# Patient Record
Sex: FEMALE | Race: WHITE | NOT HISPANIC OR LATINO
[De-identification: names, ages, dates, MRNs, and addresses within clinical notes are randomized per-mention and may not be internally consistent; named-entity substitution may affect disease eponyms.]

---

## 2018-11-03 ENCOUNTER — TRANSCRIPTION ENCOUNTER (OUTPATIENT)
Age: 53
End: 2018-11-03

## 2020-07-18 ENCOUNTER — EMERGENCY (EMERGENCY)
Facility: HOSPITAL | Age: 55
LOS: 1 days | Discharge: HOME | End: 2020-07-18
Attending: EMERGENCY MEDICINE
Payer: COMMERCIAL

## 2020-07-18 VITALS
DIASTOLIC BLOOD PRESSURE: 77 MMHG | SYSTOLIC BLOOD PRESSURE: 121 MMHG | TEMPERATURE: 98 F | WEIGHT: 139.99 LBS | RESPIRATION RATE: 20 BRPM | HEIGHT: 66 IN | OXYGEN SATURATION: 96 % | HEART RATE: 110 BPM

## 2020-07-18 VITALS
OXYGEN SATURATION: 98 % | RESPIRATION RATE: 20 BRPM | DIASTOLIC BLOOD PRESSURE: 71 MMHG | SYSTOLIC BLOOD PRESSURE: 123 MMHG | HEART RATE: 80 BPM | TEMPERATURE: 98 F

## 2020-07-18 LAB
ALBUMIN SERPL ELPH-MCNC: 4.9 G/DL — SIGNIFICANT CHANGE UP (ref 3.3–5)
ALP SERPL-CCNC: 57 U/L — SIGNIFICANT CHANGE UP (ref 40–120)
ALT FLD-CCNC: 12 U/L — SIGNIFICANT CHANGE UP (ref 10–45)
ANION GAP SERPL CALC-SCNC: 13 MMOL/L — SIGNIFICANT CHANGE UP (ref 5–17)
APPEARANCE UR: CLEAR — SIGNIFICANT CHANGE UP
APTT BLD: 28.3 SEC — SIGNIFICANT CHANGE UP (ref 27.5–35.5)
AST SERPL-CCNC: 23 U/L — SIGNIFICANT CHANGE UP (ref 10–40)
BACTERIA # UR AUTO: NEGATIVE — SIGNIFICANT CHANGE UP
BASOPHILS # BLD AUTO: 0.04 K/UL — SIGNIFICANT CHANGE UP (ref 0–0.2)
BASOPHILS NFR BLD AUTO: 0.3 % — SIGNIFICANT CHANGE UP (ref 0–2)
BILIRUB SERPL-MCNC: 0.5 MG/DL — SIGNIFICANT CHANGE UP (ref 0.2–1.2)
BILIRUB UR-MCNC: NEGATIVE — SIGNIFICANT CHANGE UP
BUN SERPL-MCNC: 11 MG/DL — SIGNIFICANT CHANGE UP (ref 7–23)
CALCIUM SERPL-MCNC: 10.5 MG/DL — SIGNIFICANT CHANGE UP (ref 8.4–10.5)
CHLORIDE SERPL-SCNC: 101 MMOL/L — SIGNIFICANT CHANGE UP (ref 96–108)
CK SERPL-CCNC: 158 U/L — SIGNIFICANT CHANGE UP (ref 25–170)
CO2 SERPL-SCNC: 22 MMOL/L — SIGNIFICANT CHANGE UP (ref 22–31)
COLOR SPEC: YELLOW — SIGNIFICANT CHANGE UP
CREAT SERPL-MCNC: 0.81 MG/DL — SIGNIFICANT CHANGE UP (ref 0.5–1.3)
DIFF PNL FLD: NEGATIVE — SIGNIFICANT CHANGE UP
EOSINOPHIL # BLD AUTO: 0.01 K/UL — SIGNIFICANT CHANGE UP (ref 0–0.5)
EOSINOPHIL NFR BLD AUTO: 0.1 % — SIGNIFICANT CHANGE UP (ref 0–6)
EPI CELLS # UR: 2 /HPF — SIGNIFICANT CHANGE UP
ETHANOL SERPL-MCNC: SIGNIFICANT CHANGE UP MG/DL (ref 0–10)
GLUCOSE SERPL-MCNC: 167 MG/DL — HIGH (ref 70–99)
GLUCOSE UR QL: NEGATIVE — SIGNIFICANT CHANGE UP
HCT VFR BLD CALC: 40.9 % — SIGNIFICANT CHANGE UP (ref 34.5–45)
HGB BLD-MCNC: 13.8 G/DL — SIGNIFICANT CHANGE UP (ref 11.5–15.5)
HYALINE CASTS # UR AUTO: 5 /LPF — HIGH (ref 0–2)
IMM GRANULOCYTES NFR BLD AUTO: 0.3 % — SIGNIFICANT CHANGE UP (ref 0–1.5)
INR BLD: 0.97 RATIO — SIGNIFICANT CHANGE UP (ref 0.88–1.16)
KETONES UR-MCNC: ABNORMAL
LACTATE BLDV-MCNC: 1.6 MMOL/L — SIGNIFICANT CHANGE UP (ref 0.7–2)
LEUKOCYTE ESTERASE UR-ACNC: ABNORMAL
LIDOCAIN IGE QN: 20 U/L — SIGNIFICANT CHANGE UP (ref 7–60)
LYMPHOCYTES # BLD AUTO: 1.31 K/UL — SIGNIFICANT CHANGE UP (ref 1–3.3)
LYMPHOCYTES # BLD AUTO: 11.3 % — LOW (ref 13–44)
MCHC RBC-ENTMCNC: 31.1 PG — SIGNIFICANT CHANGE UP (ref 27–34)
MCHC RBC-ENTMCNC: 33.7 GM/DL — SIGNIFICANT CHANGE UP (ref 32–36)
MCV RBC AUTO: 92.1 FL — SIGNIFICANT CHANGE UP (ref 80–100)
MONOCYTES # BLD AUTO: 0.44 K/UL — SIGNIFICANT CHANGE UP (ref 0–0.9)
MONOCYTES NFR BLD AUTO: 3.8 % — SIGNIFICANT CHANGE UP (ref 2–14)
NEUTROPHILS # BLD AUTO: 9.8 K/UL — HIGH (ref 1.8–7.4)
NEUTROPHILS NFR BLD AUTO: 84.2 % — HIGH (ref 43–77)
NITRITE UR-MCNC: NEGATIVE — SIGNIFICANT CHANGE UP
NRBC # BLD: 0 /100 WBCS — SIGNIFICANT CHANGE UP (ref 0–0)
PH UR: 6.5 — SIGNIFICANT CHANGE UP (ref 5–8)
PLATELET # BLD AUTO: 284 K/UL — SIGNIFICANT CHANGE UP (ref 150–400)
POTASSIUM SERPL-MCNC: 3.8 MMOL/L — SIGNIFICANT CHANGE UP (ref 3.5–5.3)
POTASSIUM SERPL-SCNC: 3.8 MMOL/L — SIGNIFICANT CHANGE UP (ref 3.5–5.3)
PROT SERPL-MCNC: 7.5 G/DL — SIGNIFICANT CHANGE UP (ref 6–8.3)
PROT UR-MCNC: ABNORMAL
PROTHROM AB SERPL-ACNC: 11.6 SEC — SIGNIFICANT CHANGE UP (ref 10.6–13.6)
RBC # BLD: 4.44 M/UL — SIGNIFICANT CHANGE UP (ref 3.8–5.2)
RBC # FLD: 11.9 % — SIGNIFICANT CHANGE UP (ref 10.3–14.5)
RBC CASTS # UR COMP ASSIST: 4 /HPF — SIGNIFICANT CHANGE UP (ref 0–4)
SODIUM SERPL-SCNC: 136 MMOL/L — SIGNIFICANT CHANGE UP (ref 135–145)
SP GR SPEC: 1.03 — HIGH (ref 1.01–1.02)
UROBILINOGEN FLD QL: ABNORMAL
WBC # BLD: 11.63 K/UL — HIGH (ref 3.8–10.5)
WBC # FLD AUTO: 11.63 K/UL — HIGH (ref 3.8–10.5)
WBC UR QL: 3 /HPF — SIGNIFICANT CHANGE UP (ref 0–5)

## 2020-07-18 PROCEDURE — 73060 X-RAY EXAM OF HUMERUS: CPT

## 2020-07-18 PROCEDURE — 85610 PROTHROMBIN TIME: CPT

## 2020-07-18 PROCEDURE — 72170 X-RAY EXAM OF PELVIS: CPT | Mod: 26

## 2020-07-18 PROCEDURE — 74177 CT ABD & PELVIS W/CONTRAST: CPT | Mod: 26

## 2020-07-18 PROCEDURE — 85027 COMPLETE CBC AUTOMATED: CPT

## 2020-07-18 PROCEDURE — 73030 X-RAY EXAM OF SHOULDER: CPT | Mod: 26,RT

## 2020-07-18 PROCEDURE — 73200 CT UPPER EXTREMITY W/O DYE: CPT

## 2020-07-18 PROCEDURE — 99284 EMERGENCY DEPT VISIT MOD MDM: CPT | Mod: 25

## 2020-07-18 PROCEDURE — 70450 CT HEAD/BRAIN W/O DYE: CPT | Mod: 26

## 2020-07-18 PROCEDURE — 76377 3D RENDER W/INTRP POSTPROCES: CPT | Mod: 26

## 2020-07-18 PROCEDURE — 73030 X-RAY EXAM OF SHOULDER: CPT

## 2020-07-18 PROCEDURE — 83690 ASSAY OF LIPASE: CPT

## 2020-07-18 PROCEDURE — 71260 CT THORAX DX C+: CPT

## 2020-07-18 PROCEDURE — 72125 CT NECK SPINE W/O DYE: CPT | Mod: 26

## 2020-07-18 PROCEDURE — 73200 CT UPPER EXTREMITY W/O DYE: CPT | Mod: 26,RT

## 2020-07-18 PROCEDURE — 82550 ASSAY OF CK (CPK): CPT

## 2020-07-18 PROCEDURE — 74177 CT ABD & PELVIS W/CONTRAST: CPT

## 2020-07-18 PROCEDURE — 76377 3D RENDER W/INTRP POSTPROCES: CPT

## 2020-07-18 PROCEDURE — 96374 THER/PROPH/DIAG INJ IV PUSH: CPT | Mod: XU

## 2020-07-18 PROCEDURE — 96375 TX/PRO/DX INJ NEW DRUG ADDON: CPT | Mod: XU

## 2020-07-18 PROCEDURE — 85730 THROMBOPLASTIN TIME PARTIAL: CPT

## 2020-07-18 PROCEDURE — 81001 URINALYSIS AUTO W/SCOPE: CPT

## 2020-07-18 PROCEDURE — 72170 X-RAY EXAM OF PELVIS: CPT

## 2020-07-18 PROCEDURE — 70450 CT HEAD/BRAIN W/O DYE: CPT

## 2020-07-18 PROCEDURE — 73060 X-RAY EXAM OF HUMERUS: CPT | Mod: 26,RT

## 2020-07-18 PROCEDURE — 71045 X-RAY EXAM CHEST 1 VIEW: CPT | Mod: 26

## 2020-07-18 PROCEDURE — 71045 X-RAY EXAM CHEST 1 VIEW: CPT

## 2020-07-18 PROCEDURE — 71260 CT THORAX DX C+: CPT | Mod: 26

## 2020-07-18 PROCEDURE — 83605 ASSAY OF LACTIC ACID: CPT

## 2020-07-18 PROCEDURE — 80053 COMPREHEN METABOLIC PANEL: CPT

## 2020-07-18 PROCEDURE — 72125 CT NECK SPINE W/O DYE: CPT

## 2020-07-18 PROCEDURE — 80307 DRUG TEST PRSMV CHEM ANLYZR: CPT

## 2020-07-18 PROCEDURE — 99285 EMERGENCY DEPT VISIT HI MDM: CPT

## 2020-07-18 RX ORDER — SODIUM CHLORIDE 9 MG/ML
1000 INJECTION INTRAMUSCULAR; INTRAVENOUS; SUBCUTANEOUS ONCE
Refills: 0 | Status: COMPLETED | OUTPATIENT
Start: 2020-07-18 | End: 2020-07-18

## 2020-07-18 RX ORDER — KETOROLAC TROMETHAMINE 30 MG/ML
30 SYRINGE (ML) INJECTION ONCE
Refills: 0 | Status: DISCONTINUED | OUTPATIENT
Start: 2020-07-18 | End: 2020-07-18

## 2020-07-18 RX ORDER — FENTANYL CITRATE 50 UG/ML
50 INJECTION INTRAVENOUS ONCE
Refills: 0 | Status: DISCONTINUED | OUTPATIENT
Start: 2020-07-18 | End: 2020-07-18

## 2020-07-18 RX ORDER — OXYCODONE HYDROCHLORIDE 5 MG/1
5 TABLET ORAL ONCE
Refills: 0 | Status: DISCONTINUED | OUTPATIENT
Start: 2020-07-18 | End: 2020-07-18

## 2020-07-18 RX ORDER — OXYCODONE HYDROCHLORIDE 5 MG/1
1 TABLET ORAL
Qty: 20 | Refills: 0
Start: 2020-07-18

## 2020-07-18 RX ORDER — MORPHINE SULFATE 50 MG/1
4 CAPSULE, EXTENDED RELEASE ORAL ONCE
Refills: 0 | Status: DISCONTINUED | OUTPATIENT
Start: 2020-07-18 | End: 2020-07-18

## 2020-07-18 RX ORDER — ONDANSETRON 8 MG/1
4 TABLET, FILM COATED ORAL ONCE
Refills: 0 | Status: COMPLETED | OUTPATIENT
Start: 2020-07-18 | End: 2020-07-18

## 2020-07-18 RX ADMIN — FENTANYL CITRATE 50 MICROGRAM(S): 50 INJECTION INTRAVENOUS at 14:14

## 2020-07-18 RX ADMIN — Medication 30 MILLIGRAM(S): at 18:48

## 2020-07-18 RX ADMIN — ONDANSETRON 4 MILLIGRAM(S): 8 TABLET, FILM COATED ORAL at 17:13

## 2020-07-18 RX ADMIN — OXYCODONE HYDROCHLORIDE 5 MILLIGRAM(S): 5 TABLET ORAL at 18:48

## 2020-07-18 RX ADMIN — SODIUM CHLORIDE 1000 MILLILITER(S): 9 INJECTION INTRAMUSCULAR; INTRAVENOUS; SUBCUTANEOUS at 14:14

## 2020-07-18 RX ADMIN — MORPHINE SULFATE 4 MILLIGRAM(S): 50 CAPSULE, EXTENDED RELEASE ORAL at 17:13

## 2020-07-18 NOTE — ED ADULT NURSE NOTE - OBJECTIVE STATEMENT
55YOF no pmh presents to ED c/o R arm pain s/p fall from her house at 9:45am this morning. Pt states she landed on her right arm. Pain is 8/10 and she took 800mg motrin en route to ED. Pt states she did have LOC for 30 secs due to pain after the fall. Denies hitting her head, she was wearing a helmet. She states her horse was standing when she fell, not in motion. Denies CP, SOB, fever, chills, abd pain, N/V/D, burning upon urination. Will continue to monitor.

## 2020-07-18 NOTE — ED ADULT TRIAGE NOTE - WEIGHT IN LBS
Patient Education     Hand, Foot, and Mouth Disease (Child)    Hand, foot, and mouth disease (HFMD) is an illness caused by a virus. It is usually seen in young children. This virus causes small ulcers in the mouth (throat, lips, cheeks, gums, and tongue) and small blisters or red spots may appear on the palms (hands), diaper area, and soles of the feet. There is usually a low-grade fever and poor appetite. HFMD is not a serious illness and usually go away in 1 to 2 weeks. The painful sores in the mouth may prevent your child from eating and drinking.  It takes 3 to 5 days for the illness to appear in an exposed child. Generally, the HFMD is the most contagious during the first week of the illness. Sometimes, people can be contagious for days or weeks after the symptoms have disappeared.  HFMD can be transmitted from person to person by:  · Touching your nose, mouth, eye after touching the stool of an infected person (has the virus)  · Touching your nose, mouth, eye after touching fluid from the blisters/sores of an infected person  · Respiratory secretions (sneezing, coughing, blowing your nose)  · Touching contaminated objects (toys, doorknobs)  · Oral secretions (kissing)  Home care  Mouth pain  Unless your healthcare provider has prescribed another medicine for mouth pain:  · Acetaminophen or ibuprofen may be used for pain or discomfort or fever. Please consult your child's healthcare provider before giving your child acetaminophen or ibuprofen for dosing instructions and when to give the medicine (schedule).  Do not give ibuprofen to an infant 6 months of age or younger. If your child has chronic liver or kidney disease or ever had a stomach ulcer or gastrointestinal bleeding, talk with your healthcare provider before using these medicines. Never give aspirin to anyone under 18 years of age who has a fever. It may cause severe disease (Reye Syndrome) or death. Talk to your child's healthcare provider before  giving him or her over-the counter medicines.  · Liquid rinses may be used in children over 12 months of age. Ask your child's healthcare provider for instructions.  Feeding  Follow a soft diet with plenty of fluids to prevent dehydration. If your child doesn't want to eat solid foods, it's OK for a few days, as long as he or she drinks lots of fluid. Cool drinks and frozen treats (sherbet) are soothing and easier to take. Avoid citrus juices (orange juice, lemonade, etc.) and salty or spicy foods. These may cause more pain in the mouth sores.  Return to  or school  Children may usually return to day care or school once the fever is gone and they are eating and drinking well. Contact your healthcare provider and ask when your child is able to return to  or school.  Follow up  Follow up with your child's healthcare provider, or as advised.  When to seek medical advice  Call your child's healthcare provider right away if any of these occur:  · Your child complains of pain in the back of the neck  · Your child has a severe headache or continued vomiting  · Your child is having trouble breathing  · Your child is drowsy or has trouble staying awake  · Your child is having trouble swallowing  · Mouth ulcers are present after 2 weeks  · Your child's symptoms are getting worse  · Your child appears to be dehydrated (dry mouth, no tears, haven' t urinated is 8 or more hours)  · Your child has a fever (see Fever and children, below)  Call 911  Call 911 if any of these occur:  · Unusual fussiness, drowsiness, or confusion  · Severe headache or vomiting that continues  · Trouble breathing  · Seizures  Fever and children  Always use a digital thermometer to check your child’s temperature. Never use a mercury thermometer.  For infants and toddlers, be sure to use a rectal thermometer correctly. A rectal thermometer may accidentally poke a hole in (perforate) the rectum. It may also pass on germs from the stool.  Always follow the product maker’s directions for proper use. If you don’t feel comfortable taking a rectal temperature, use another method. When you talk to your child’s healthcare provider, tell him or her which method you used to take your child’s temperature.  Here are guidelines for fever temperature. Ear temperatures aren’t accurate before 6 months of age. Don’t take an oral temperature until your child is at least 4 years old.  Infant under 3 months old:  · Ask your child’s healthcare provider how you should take the temperature.  · Rectal or forehead (temporal artery) temperature of 100.4°F (38°C) or higher, or as directed by the provider  · Armpit temperature of 99°F (37.2°C) or higher, or as directed by the provider  Child age 3 to 36 months:  · Rectal, forehead (temporal artery), or ear temperature of 102°F (38.9°C) or higher, or as directed by the provider  · Armpit temperature of 101°F (38.3°C) or higher, or as directed by the provider  Child of any age:  · Repeated temperature of 104°F (40°C) or higher, or as directed by the provider  · Fever that lasts more than 24 hours in a child under 2 years old. Or a fever that lasts for 3 days in a child 2 years or older.   Date Last Reviewed: 11/1/2017 © 2000-2018 [employer]. [address] [place] [address], [place] [address]. All rights reserved. This information is not intended as a substitute for professional medical care. Always follow your healthcare professional's instructions.            139.9

## 2020-07-18 NOTE — CONSULT NOTE ADULT - ASSESSMENT
A/P: 55yFemale with R proximal humerus fracture  No acute orthopedic surgical intervention indicated at this time  Analgesia  NWB in sling  PT/OT  Recommend outpatient followup with orthopedics, Dr. Pires within 1 week of discharge from hospital  Discussed with attending, Dr. Pires who is aware and in agreement with above plan A/P: 55yFemale with R proximal humerus fracture  No acute orthopedic surgical intervention indicated at this time  Analgesia  NWB RUE in sling  PT/OT  Encourage gently ROM of elbow, wrist and fingers  Recommend outpatient followup with orthopedics, Dr. Pires within 1 week of discharge from hospital  Discussed with attending, Dr. Pires who is aware and in agreement with above plan

## 2020-07-18 NOTE — ED PROVIDER NOTE - PATIENT PORTAL LINK FT
You can access the FollowMyHealth Patient Portal offered by [facility] by registering at the following website: [email or web address]. By joining [employer]’s FollowMyHealth portal, you will also be able to view your health information using other applications (apps) compatible with our system.

## 2020-07-18 NOTE — ED PROVIDER NOTE - CARE PROVIDER_API CALL
Rod Pires  ORTHOPAEDIC SURGERY  [address] [place] [address], [address]  [place] [address]  Phone: [phone]  Fax: [phone]  Follow Up Time:

## 2020-07-18 NOTE — CONSULT NOTE ADULT - SUBJECTIVE AND OBJECTIVE BOX
55yFemale RHD who presents to [facility] ED today with a c/o R shoulder pain  s/p MF off a horse earlier today. Patient denies head hit but admits to passing out for 3-5 seconds 2/2 to pain. Patient denies numbness or tingling in the RUE. Denies having any other pain elsewhere. Admits to previous L Ankle ORIF by OSH ~5 yrs ago that was complicated by an infection.  No other orthopedic concerns at this time.    ROS: 10 point ROS otherwise negative    PAST MEDICAL & SURGICAL HISTORY:  Hysterectomy  L Ankle ORIF    Meds: See med rec    T(C): 36.8 (07-18-20 @ 17:16)  HR: 80 (07-18-20 @ 17:16)  BP: 123/71 (07-18-20 @ 17:16)  RR: 20 (07-18-20 @ 17:16)  SpO2: 98% (07-18-20 @ 17:16)  Wt(kg): --    Gen: NAD  Resp: Unlabored breathing  PE RUE:  Skin intact, no erythema or ecchymosis   SILT axillary/med/rad/ulnar  +Motor AIN/PIN/Ulnar/Radial/Musc/Median,   +painless elbow/wrist ROM,   shoulder ROM limited 2/2 pain,   2+radial pulse, soft compartments.    Secondary Survey:   RLE/LLE/LUE: No TTP over bony prominences, SILT, palpable pulses, full/painless range of motion, compartments soft    Spine: No bony tenderness. No palpable stepoffs.     Imaging:  XR demonstrating R proximal humerus fracture. CT shows no acute dislocation.

## 2020-07-18 NOTE — ED PROVIDER NOTE - CLINICAL SUMMARY MEDICAL DECISION MAKING FREE TEXT BOX
Otherwise healthy F c R shoulder pain after fall from horse.  Pt states horse walking, tripped, went down on knees, and patient fell to R side.  Specifically states she was not thrown from horse.  Helmeted, no head trauma.  Not on AC.  Primary survey WNL.  Secondary survey shows tenderness to palpation and reduced/severe pain c R shoulder range of motion.  No NV injury evident.  Compartments soft.  No lacerations.  Given mechanism will pan scan, check R shoulder XR +/- CT, pain Rx, consider ortho c/s.  --BMM

## 2020-07-18 NOTE — ED ADULT TRIAGE NOTE - CHIEF COMPLAINT QUOTE
pt with right arm injury while riding horse fell down pt fell to side pt states she had episode of passing out briefly post accident pt denies hitting head incident occurred 9:45am pt states she was wearing helmet

## 2020-07-18 NOTE — ED PROVIDER NOTE - ATTENDING CONTRIBUTION TO CARE
I have reviewed this note, the presenting symptoms, and the Chief Complaint and the History of Present Illness as documented, with the other care provider(s) including resident, ACP, and nurses on the patient care team. I have also reviewed this patient's past medical/surgical history and social/family history as reviewed and listed in this electronic medical record.  I agree with the resident/ACP documentation except where noted otherwise in my personal documentation.  See MDM.  --BMM

## 2020-07-18 NOTE — ED PROVIDER NOTE - NSFOLLOWUPINSTRUCTIONS_ED_ALL_ED_FT
Ice to pain area.  Keep the sling and encourage gently ROM of elbow, wrist and fingers.  Take Ibuprofen or Tylenol for pain as per package directed and respect warnings on the label.  Oxycodone for severe pain as directed with precautions of drowsiness and constipation.  Stool softener as needed.  Followup with orthopedics, Dr. Pires within 1 week of discharge from hospital, call Monday for appointment.  Follow up with your primary Dr. for incidental finding of lung nodules and thyroid nodule (please bring the copy of CT/ lab results).  Return for any concerns or worsening symptoms.

## 2020-07-18 NOTE — ED PROVIDER NOTE - PHYSICAL EXAMINATION
NAD, Tachycardia, Afebrile. No facial or scalp tender. Lungs clear. ABD soft, non tender. No RIB or CVA tender. + Right shoulder swelling, tender and diminished ROMs. N/V- intact. No elbow/ wrist tender. No pelvic or hip tender. No neuro deficit.

## 2020-07-18 NOTE — ED PROVIDER NOTE - PROGRESS NOTE DETAILS
Informed CT incidental findings of lung nodules and Thyroid nodule and pt will f/i with PMD for reevaluation. Pt's evaluated by ortho and recommended; No acute orthopedic surgical intervention indicated at this time, Analgesics, NWB RUE in sling and outpatient followup with orthopedics, Dr. Pires within 1 week of discharge from hospital. Awaiting head/ cervical CT result now but pt wants to go home now. Requested call her back for abnormal findings. Denies headache, neck pain or sensory changes/ weakness to extremities. Neuro intact. Pt is waiting head/ cervical CT result now but pt wants to go home now. Requested call her back for abnormal findings. Denies headache, neck pain or sensory changes/ weakness to extremities. Neuro intact. Pt's discharged at 1900 but still awaiting for Head/ C- spine CT reading. Called radiology and was informed the  issues, trying to figure it out. Still awaiting for official reading of Head/ C- spine CT and radiology informed NAD in head/ cervical CT.

## 2020-07-28 ENCOUNTER — OUTPATIENT (OUTPATIENT)
Dept: OUTPATIENT SERVICES | Facility: HOSPITAL | Age: 55
LOS: 1 days | End: 2020-07-28
Payer: COMMERCIAL

## 2020-07-28 ENCOUNTER — TRANSCRIPTION ENCOUNTER (OUTPATIENT)
Age: 55
End: 2020-07-28

## 2020-07-28 VITALS
WEIGHT: 143.08 LBS | OXYGEN SATURATION: 98 % | HEART RATE: 80 BPM | DIASTOLIC BLOOD PRESSURE: 60 MMHG | RESPIRATION RATE: 16 BRPM | SYSTOLIC BLOOD PRESSURE: 116 MMHG | TEMPERATURE: 98 F | HEIGHT: 66 IN

## 2020-07-28 DIAGNOSIS — Z90.710 ACQUIRED ABSENCE OF BOTH CERVIX AND UTERUS: Chronic | ICD-10-CM

## 2020-07-28 DIAGNOSIS — Z01.818 ENCOUNTER FOR OTHER PREPROCEDURAL EXAMINATION: ICD-10-CM

## 2020-07-28 DIAGNOSIS — S42.221D 2-PART DISPLACED FRACTURE OF SURGICAL NECK OF RIGHT HUMERUS, SUBSEQUENT ENCOUNTER FOR FRACTURE WITH ROUTINE HEALING: ICD-10-CM

## 2020-07-28 DIAGNOSIS — Z11.59 ENCOUNTER FOR SCREENING FOR OTHER VIRAL DISEASES: ICD-10-CM

## 2020-07-28 DIAGNOSIS — S82.892A OTHER FRACTURE OF LEFT LOWER LEG, INITIAL ENCOUNTER FOR CLOSED FRACTURE: Chronic | ICD-10-CM

## 2020-07-28 DIAGNOSIS — Z90.89 ACQUIRED ABSENCE OF OTHER ORGANS: Chronic | ICD-10-CM

## 2020-07-28 DIAGNOSIS — Z29.9 ENCOUNTER FOR PROPHYLACTIC MEASURES, UNSPECIFIED: ICD-10-CM

## 2020-07-28 DIAGNOSIS — S42.309A UNSPECIFIED FRACTURE OF SHAFT OF HUMERUS, UNSPECIFIED ARM, INITIAL ENCOUNTER FOR CLOSED FRACTURE: ICD-10-CM

## 2020-07-28 LAB
BLD GP AB SCN SERPL QL: NEGATIVE — SIGNIFICANT CHANGE UP
RH IG SCN BLD-IMP: POSITIVE — SIGNIFICANT CHANGE UP
SARS-COV-2 RNA SPEC QL NAA+PROBE: SIGNIFICANT CHANGE UP

## 2020-07-28 PROCEDURE — 86900 BLOOD TYPING SEROLOGIC ABO: CPT

## 2020-07-28 PROCEDURE — U0003: CPT

## 2020-07-28 PROCEDURE — G0463: CPT

## 2020-07-28 PROCEDURE — 86901 BLOOD TYPING SEROLOGIC RH(D): CPT

## 2020-07-28 PROCEDURE — 86850 RBC ANTIBODY SCREEN: CPT

## 2020-07-28 RX ORDER — CEFAZOLIN SODIUM 1 G
2000 VIAL (EA) INJECTION ONCE
Refills: 0 | Status: DISCONTINUED | OUTPATIENT
Start: 2020-07-29 | End: 2020-07-30

## 2020-07-28 RX ORDER — CHLORHEXIDINE GLUCONATE 213 G/1000ML
1 SOLUTION TOPICAL ONCE
Refills: 0 | Status: DISCONTINUED | OUTPATIENT
Start: 2020-07-29 | End: 2020-07-30

## 2020-07-28 RX ORDER — LIDOCAINE HCL 20 MG/ML
0.2 VIAL (ML) INJECTION ONCE
Refills: 0 | Status: DISCONTINUED | OUTPATIENT
Start: 2020-07-29 | End: 2020-07-30

## 2020-07-28 NOTE — H&P PST ADULT - NSICDXPROBLEM_GEN_ALL_CORE_FT
PROBLEM DIAGNOSES  Problem: Humerus fracture  Assessment and Plan: Open reduction internal fixation of right humerus    Problem: Need for prophylactic measure  Assessment and Plan: The Caprini score indicates this patient is at risk for a VTE event (score 3-5).  Most surgical patients in this group would benefit from pharmacologic prophylaxis.  The surgical team will determine the balance between VTE risk and bleeding risk

## 2020-07-28 NOTE — H&P PST ADULT - NSICDXPASTMEDICALHX_GEN_ALL_CORE_FT
PAST MEDICAL HISTORY:  Depression     History of seasonal allergies     Thyroid nodule PAST MEDICAL HISTORY:  Depression     History of seasonal allergies     Thyroid nodule     Uterine cancer age 29, hysterectomy (no chemo)

## 2020-07-28 NOTE — H&P PST ADULT - NSANTHOSAYNRD_GEN_A_CORE
No. BOOM screening performed.  STOP BANG Legend: 0-2 = LOW Risk; 3-4 = INTERMEDIATE Risk; 5-8 = HIGH Risk

## 2020-07-28 NOTE — H&P PST ADULT - HISTORY OF PRESENT ILLNESS
2/10  9/10 55 year old female presents for ORIF right humerus. Pt. s/p fall while horseback riding 7/18/20, evaluated in ER at [facility]. Recent orthopedic evaluation revealed surgery was indicated. Pt. reports pain level a 2/10 at rest and a 9/10 with movement. COVID swab performed during PST evaluation 7/28/20.

## 2020-07-28 NOTE — H&P PST ADULT - NSICDXPASTSURGICALHX_GEN_ALL_CORE_FT
PAST SURGICAL HISTORY:  Ankle fracture, left s/p ORIF, post op wound infection    H/O: hysterectomy uterine cancer, post op wound infection age 29 PAST SURGICAL HISTORY:  Ankle fracture, left 2017 s/p ORIF, post op wound infection; hardware removed    H/O: hysterectomy uterine cancer, post op wound infection age 29    History of adenoidectomy

## 2020-07-28 NOTE — H&P PST ADULT - ASSESSMENT
CAPRINI SCORE [CLOT]    AGE RELATED RISK FACTORS                                                       MOBILITY RELATED FACTORS  [X ] Age 41-60 years                                            (1 Point)                  [ ] Bed rest                                                        (1 Point)  [ ] Age: 61-74 years                                           (2 Points)                 [ ] Plaster cast                                                   (2 Points)  [ ] Age= 75 years                                              (3 Points)                 [ ] Bed bound for more than 72 hours                 (2 Points)    DISEASE RELATED RISK FACTORS                                               GENDER SPECIFIC FACTORS  [ ] Edema in the lower extremities                       (1 Point)                  [ ] Pregnancy                                                     (1 Point)  [ ] Varicose veins                                               (1 Point)                  [ ] Post-partum < 6 weeks                                   (1 Point)             [ ] BMI > 25 Kg/m2                                            (1 Point)                  [ ] Hormonal therapy  or oral contraception          (1 Point)                 [ ] Sepsis (in the previous month)                        (1 Point)                  [ ] History of pregnancy complications                 (1 point)  [ ] Pneumonia or serious lung disease                                               [ ] Unexplained or recurrent                     (1 Point)           (in the previous month)                               (1 Point)  [ ] Abnormal pulmonary function test                     (1 Point)                 SURGERY RELATED RISK FACTORS  [ ] Acute myocardial infarction                              (1 Point)                 [ ]  Section                                             (1 Point)  [ ] Congestive heart failure (in the previous month)  (1 Point)               [ ] Minor surgery                                                  (1 Point)   [ ] Inflammatory bowel disease                             (1 Point)                 [ ] Arthroscopic surgery                                        (2 Points)  [ ] Central venous access                                      (2 Points)                [x ] General surgery lasting more than 45 minutes   (2 Points)       [ ] Stroke (in the previous month)                          (5 Points)               [ ] Elective arthroplasty                                         (5 Points)                                                                                                                                               HEMATOLOGY RELATED FACTORS                                                 TRAUMA RELATED RISK FACTORS  [ ] Prior episodes of VTE                                     (3 Points)                 [ ] Fracture of the hip, pelvis, or leg                       (5 Points)  [ ] Positive family history for VTE                         (3 Points)                 [ ] Acute spinal cord injury (in the previous month)  (5 Points)  [ ] Prothrombin 15631 A                                     (3 Points)                 [ ] Paralysis  (less than 1 month)                             (5 Points)  [ ] Factor V Leiden                                             (3 Points)                  [ ] Multiple Trauma within 1 month                        (5 Points)  [ ] Lupus anticoagulants                                     (3 Points)                                                           [ ] Anticardiolipin antibodies                               (3 Points)                                                       [ ] High homocysteine in the blood                      (3 Points)                                             [ ] Other congenital or acquired thrombophilia      (3 Points)                                                [ ] Heparin induced thrombocytopenia                  (3 Points)                                          Total Score [     3     ]

## 2020-07-29 ENCOUNTER — INPATIENT (INPATIENT)
Facility: HOSPITAL | Age: 55
LOS: 0 days | Discharge: HOME | End: 2020-07-30
Attending: ORTHOPAEDIC SURGERY | Admitting: ORTHOPAEDIC SURGERY

## 2020-07-29 ENCOUNTER — TRANSCRIPTION ENCOUNTER (OUTPATIENT)
Age: 55
End: 2020-07-29

## 2020-07-29 ENCOUNTER — OUTPATIENT (OUTPATIENT)
Dept: OUTPATIENT SERVICES | Facility: HOSPITAL | Age: 55
LOS: 1 days | End: 2020-07-29
Payer: COMMERCIAL

## 2020-07-29 VITALS
RESPIRATION RATE: 18 BRPM | HEART RATE: 70 BPM | DIASTOLIC BLOOD PRESSURE: 78 MMHG | TEMPERATURE: 98 F | WEIGHT: 143.08 LBS | SYSTOLIC BLOOD PRESSURE: 114 MMHG | HEIGHT: 66 IN | OXYGEN SATURATION: 98 %

## 2020-07-29 VITALS
RESPIRATION RATE: 16 BRPM | SYSTOLIC BLOOD PRESSURE: 114 MMHG | WEIGHT: 143.08 LBS | TEMPERATURE: 98 F | HEART RATE: 70 BPM | OXYGEN SATURATION: 98 % | HEIGHT: 66 IN | DIASTOLIC BLOOD PRESSURE: 78 MMHG

## 2020-07-29 DIAGNOSIS — S42.221D 2-PART DISPLACED FRACTURE OF SURGICAL NECK OF RIGHT HUMERUS, SUBSEQUENT ENCOUNTER FOR FRACTURE WITH ROUTINE HEALING: ICD-10-CM

## 2020-07-29 DIAGNOSIS — S82.892A OTHER FRACTURE OF LEFT LOWER LEG, INITIAL ENCOUNTER FOR CLOSED FRACTURE: Chronic | ICD-10-CM

## 2020-07-29 DIAGNOSIS — Z90.89 ACQUIRED ABSENCE OF OTHER ORGANS: Chronic | ICD-10-CM

## 2020-07-29 DIAGNOSIS — Z90.710 ACQUIRED ABSENCE OF BOTH CERVIX AND UTERUS: Chronic | ICD-10-CM

## 2020-07-29 RX ORDER — SODIUM CHLORIDE 9 MG/ML
3 INJECTION INTRAMUSCULAR; INTRAVENOUS; SUBCUTANEOUS EVERY 8 HOURS
Refills: 0 | Status: DISCONTINUED | OUTPATIENT
Start: 2020-07-29 | End: 2020-07-29

## 2020-07-29 RX ORDER — CHLORHEXIDINE GLUCONATE 213 G/1000ML
1 SOLUTION TOPICAL ONCE
Refills: 0 | Status: DISCONTINUED | OUTPATIENT
Start: 2020-07-29 | End: 2020-08-13

## 2020-07-29 RX ORDER — CEFAZOLIN SODIUM 1 G
2000 VIAL (EA) INJECTION ONCE
Refills: 0 | Status: DISCONTINUED | OUTPATIENT
Start: 2020-07-29 | End: 2020-08-13

## 2020-07-29 RX ORDER — SODIUM CHLORIDE 9 MG/ML
1000 INJECTION, SOLUTION INTRAVENOUS
Refills: 0 | Status: DISCONTINUED | OUTPATIENT
Start: 2020-07-29 | End: 2020-07-29

## 2020-07-29 RX ORDER — OXYCODONE HYDROCHLORIDE 5 MG/1
10 TABLET ORAL EVERY 4 HOURS
Refills: 0 | Status: DISCONTINUED | OUTPATIENT
Start: 2020-07-29 | End: 2020-07-29

## 2020-07-29 RX ORDER — LIDOCAINE HCL 20 MG/ML
0.2 VIAL (ML) INJECTION ONCE
Refills: 0 | Status: DISCONTINUED | OUTPATIENT
Start: 2020-07-29 | End: 2020-08-13

## 2020-07-29 RX ORDER — OXYCODONE HYDROCHLORIDE 5 MG/1
5 TABLET ORAL EVERY 4 HOURS
Refills: 0 | Status: DISCONTINUED | OUTPATIENT
Start: 2020-07-29 | End: 2020-07-29

## 2020-07-29 RX ORDER — ONDANSETRON 8 MG/1
4 TABLET, FILM COATED ORAL EVERY 6 HOURS
Refills: 0 | Status: DISCONTINUED | OUTPATIENT
Start: 2020-07-29 | End: 2020-07-29

## 2020-07-29 RX ORDER — ALPRAZOLAM 0.25 MG
0.25 TABLET ORAL DAILY
Refills: 0 | Status: DISCONTINUED | OUTPATIENT
Start: 2020-07-29 | End: 2020-07-30

## 2020-07-29 RX ORDER — ESCITALOPRAM OXALATE 10 MG/1
1 TABLET, FILM COATED ORAL
Qty: 0 | Refills: 0 | DISCHARGE

## 2020-07-29 RX ORDER — ACETAMINOPHEN 500 MG
1000 TABLET ORAL ONCE
Refills: 0 | Status: COMPLETED | OUTPATIENT
Start: 2020-07-29 | End: 2020-07-29

## 2020-07-29 RX ORDER — ACETAMINOPHEN 500 MG
1000 TABLET ORAL ONCE
Refills: 0 | Status: COMPLETED | OUTPATIENT
Start: 2020-07-30 | End: 2020-07-30

## 2020-07-29 RX ORDER — ESCITALOPRAM OXALATE 10 MG/1
5 TABLET, FILM COATED ORAL DAILY
Refills: 0 | Status: DISCONTINUED | OUTPATIENT
Start: 2020-07-29 | End: 2020-07-29

## 2020-07-29 RX ORDER — ACETAMINOPHEN 500 MG
975 TABLET ORAL EVERY 6 HOURS
Refills: 0 | Status: DISCONTINUED | OUTPATIENT
Start: 2020-07-29 | End: 2020-07-29

## 2020-07-29 RX ORDER — SODIUM CHLORIDE 9 MG/ML
500 INJECTION INTRAMUSCULAR; INTRAVENOUS; SUBCUTANEOUS ONCE
Refills: 0 | Status: COMPLETED | OUTPATIENT
Start: 2020-07-29 | End: 2020-07-29

## 2020-07-29 RX ORDER — ONDANSETRON 8 MG/1
4 TABLET, FILM COATED ORAL EVERY 6 HOURS
Refills: 0 | Status: DISCONTINUED | OUTPATIENT
Start: 2020-07-29 | End: 2020-07-30

## 2020-07-29 RX ORDER — CEFAZOLIN SODIUM 1 G
2000 VIAL (EA) INJECTION EVERY 8 HOURS
Refills: 0 | Status: COMPLETED | OUTPATIENT
Start: 2020-07-29 | End: 2020-07-29

## 2020-07-29 RX ORDER — ESCITALOPRAM OXALATE 10 MG/1
5 TABLET, FILM COATED ORAL DAILY
Refills: 0 | Status: DISCONTINUED | OUTPATIENT
Start: 2020-07-29 | End: 2020-07-30

## 2020-07-29 RX ORDER — OXYCODONE HYDROCHLORIDE 5 MG/1
5 TABLET ORAL EVERY 4 HOURS
Refills: 0 | Status: DISCONTINUED | OUTPATIENT
Start: 2020-07-29 | End: 2020-07-30

## 2020-07-29 RX ORDER — OXYCODONE HYDROCHLORIDE 5 MG/1
10 TABLET ORAL EVERY 4 HOURS
Refills: 0 | Status: DISCONTINUED | OUTPATIENT
Start: 2020-07-29 | End: 2020-07-30

## 2020-07-29 RX ORDER — SODIUM CHLORIDE 9 MG/ML
1000 INJECTION, SOLUTION INTRAVENOUS
Refills: 0 | Status: DISCONTINUED | OUTPATIENT
Start: 2020-07-29 | End: 2020-07-30

## 2020-07-29 RX ORDER — CETIRIZINE HYDROCHLORIDE 10 MG/1
1 TABLET ORAL
Qty: 0 | Refills: 0 | DISCHARGE

## 2020-07-29 RX ORDER — CEFAZOLIN SODIUM 1 G
2000 VIAL (EA) INJECTION EVERY 8 HOURS
Refills: 0 | Status: DISCONTINUED | OUTPATIENT
Start: 2020-07-29 | End: 2020-07-29

## 2020-07-29 RX ORDER — HYDROMORPHONE HYDROCHLORIDE 2 MG/ML
0.25 INJECTION INTRAMUSCULAR; INTRAVENOUS; SUBCUTANEOUS
Refills: 0 | Status: DISCONTINUED | OUTPATIENT
Start: 2020-07-29 | End: 2020-07-29

## 2020-07-29 RX ORDER — HYDROMORPHONE HYDROCHLORIDE 2 MG/ML
0.5 INJECTION INTRAMUSCULAR; INTRAVENOUS; SUBCUTANEOUS
Refills: 0 | Status: DISCONTINUED | OUTPATIENT
Start: 2020-07-29 | End: 2020-07-29

## 2020-07-29 RX ORDER — ACETAMINOPHEN 500 MG
975 TABLET ORAL EVERY 6 HOURS
Refills: 0 | Status: DISCONTINUED | OUTPATIENT
Start: 2020-07-29 | End: 2020-07-30

## 2020-07-29 RX ADMIN — Medication 1000 MILLIGRAM(S): at 16:07

## 2020-07-29 RX ADMIN — Medication 1000 MILLIGRAM(S): at 22:47

## 2020-07-29 RX ADMIN — OXYCODONE HYDROCHLORIDE 10 MILLIGRAM(S): 5 TABLET ORAL at 19:41

## 2020-07-29 RX ADMIN — Medication 400 MILLIGRAM(S): at 22:18

## 2020-07-29 RX ADMIN — Medication 100 MILLIGRAM(S): at 14:16

## 2020-07-29 RX ADMIN — HYDROMORPHONE HYDROCHLORIDE 0.25 MILLIGRAM(S): 2 INJECTION INTRAMUSCULAR; INTRAVENOUS; SUBCUTANEOUS at 10:51

## 2020-07-29 RX ADMIN — Medication 400 MILLIGRAM(S): at 15:37

## 2020-07-29 RX ADMIN — ESCITALOPRAM OXALATE 5 MILLIGRAM(S): 10 TABLET, FILM COATED ORAL at 21:25

## 2020-07-29 RX ADMIN — SODIUM CHLORIDE 1000 MILLILITER(S): 9 INJECTION INTRAMUSCULAR; INTRAVENOUS; SUBCUTANEOUS at 17:48

## 2020-07-29 RX ADMIN — Medication 100 MILLIGRAM(S): at 21:25

## 2020-07-29 RX ADMIN — SODIUM CHLORIDE 125 MILLILITER(S): 9 INJECTION, SOLUTION INTRAVENOUS at 21:26

## 2020-07-29 RX ADMIN — HYDROMORPHONE HYDROCHLORIDE 0.25 MILLIGRAM(S): 2 INJECTION INTRAMUSCULAR; INTRAVENOUS; SUBCUTANEOUS at 10:00

## 2020-07-29 RX ADMIN — Medication 0.25 MILLIGRAM(S): at 22:32

## 2020-07-29 RX ADMIN — OXYCODONE HYDROCHLORIDE 10 MILLIGRAM(S): 5 TABLET ORAL at 20:10

## 2020-07-29 RX ADMIN — HYDROMORPHONE HYDROCHLORIDE 0.5 MILLIGRAM(S): 2 INJECTION INTRAMUSCULAR; INTRAVENOUS; SUBCUTANEOUS at 11:00

## 2020-07-29 NOTE — DISCHARGE NOTE PROVIDER - NSDCFUADDINST_GEN_ALL_CORE_FT
Please call to schedule follow up appointment with Dr. Pires for 2 weeks after hospital discharge.     Keep dressing clean, dry, and intact.    Continue to not put any weight through the right arm, and wear the sling as needed for comfort.     Continue to walk and ambulate as much as possible to help prevent blood clots.     Recommended to follow up with your primary care provider within 3-4 weeks of hospital discharge to discuss recent surgery and any change to medications.

## 2020-07-29 NOTE — PHYSICAL THERAPY INITIAL EVALUATION ADULT - DISCHARGE DISPOSITION, PT EVAL
Outpatient PT when deemed medically appropriate. Spouse available to assist/supervise as needed. No device needs. No acute skilled PT needs. Pt cleared for DC from PT standpoint. Pt aware and in agreement. Please reconsult as appropriate. THOR Roa aware and STEFAN Nidia to leave note for CM.

## 2020-07-29 NOTE — CHART NOTE - NSCHARTNOTEFT_GEN_A_CORE
ORTHO  Patient is a 55y old  Female who presents with a chief complaint of "I fractured my humerus" (28 Jul 2020 08:43)    Pt. resting without complaint    VS-  T(C): 36.7 (07-29-20 @ 12:16), Max: 36.8 (07-29-20 @ 06:26)  HR: 114 (07-29-20 @ 12:16) (70 - 114)  BP: 107/63 (07-29-20 @ 12:16) (107/63 - 143/62)  RR: 18 (07-29-20 @ 12:16) (16 - 18)  SpO2: 95% (07-29-20 @ 12:16) (95% - 100%)  Wt(kg): --    M.S. A&O  Extremity- Right UE- dressing- C/D/I, Hemovac-self suction  Neuro-              Motor- AROM- wrist, hand , and digits              Sensation- grossly intact to light touch with some decrease         Impression: Stable       Plan:   Continue present treatment                 Out of bed, ambulate, weight bearing (status)                  Physical therapy evaluation, follow up                  Continue to monitor                      Kemal Chow PA-C  Orthopaedic Surgery  Team pager [phone]  [phone]

## 2020-07-29 NOTE — PHYSICAL THERAPY INITIAL EVALUATION ADULT - GENERAL OBSERVATIONS, REHAB EVAL
Pt deysi 40 min eval well. Pt rec'd in bed, RUE sling, hemovac, primafit, peripheral IV line, NAD. pt agreed to session. Pt s/p R proximal humerus ORIF s/p fall off horse. Confirmed precautions with THOR Roa (hand/wrist ROM only, no lifting), NWB RUE, reviewed with pt. Orthostatics negative.

## 2020-07-29 NOTE — DISCHARGE NOTE PROVIDER - HOSPITAL COURSE
History of Present Illness	    55 year old female presents for ORIF right humerus. Patient is s/p fall while horseback riding 7/18/20, evaluated in ER at [facility]. Recent orthopedic evaluation revealed surgery was indicated. Patient reports pain level a 2/10 at rest and a 9/10 with movement. COVID swab performed during PST evaluation 7/28/20.        Patient underwent open reduction internal fixation for right humerus fracture on 7/29. Patient tolerated procedure well.         Patient was evaluated by physical therapy team whom recommended home with outpatient physical therapy for hospital discharge.     Remainder of hospital stay unremarkable. Patient stable and ready for discharge when hemovac removed. History of Present Illness	    55 year old female presents for ORIF right humerus. Patient is s/p fall while horseback riding 7/18/20, evaluated in ER at [facility]. Recent orthopedic evaluation revealed surgery was indicated. Patient reports pain level a 2/10 at rest and a 9/10 with movement. COVID swab performed during PST evaluation 7/28/20.        Patient underwent open reduction internal fixation for right humerus fracture on 7/29. Patient tolerated procedure well.         Patient was evaluated by Occupational therapy whom recommended home with outpatient physical therapy for hospital discharge.     Remainder of hospital stay unremarkable. Patient stable and ready for discharge when hemovac removed.

## 2020-07-29 NOTE — DISCHARGE NOTE PROVIDER - NSDCACTIVITY_GEN_ALL_CORE
Do not make important decisions/Do not drive or operate machinery/Patient may shower, limit direct water to dressing, if wet pat dry./Walking - Indoors allowed/Showering allowed/Stairs allowed/Walking - Outdoors allowed/No heavy lifting/straining

## 2020-07-29 NOTE — PHYSICAL THERAPY INITIAL EVALUATION ADULT - MANUAL MUSCLE TESTING RESULTS, REHAB EVAL
LUE and BLE grossly at least 3+/5, able to move wrist and wiggle fingers of RUE/grossly assessed due to

## 2020-07-29 NOTE — PHYSICAL THERAPY INITIAL EVALUATION ADULT - PASSIVE RANGE OF MOTION EXAMINATION, REHAB EVAL
R shoulder and elbow not formally assessed 2/2 postop precautions, pt left in sling throughout/no Passive ROM deficits were identified

## 2020-07-29 NOTE — DISCHARGE NOTE PROVIDER - NSDCCPCAREPLAN_GEN_ALL_CORE_FT
PRINCIPAL DISCHARGE DIAGNOSIS  Diagnosis: Humerus fracture  Assessment and Plan of Treatment: PRINCIPAL DISCHARGE DIAGNOSIS  Diagnosis: Humerus fracture  Assessment and Plan of Treatment: right prox

## 2020-07-29 NOTE — PATIENT PROFILE ADULT - NSPRESCRUSEDDRG_GEN_A_NUR
Anticoagulation Clinic Progress Note    Anticoagulation Summary  As of 7/10/2020    INR goal:   2.5-3.5   TTR:   25.7 % (1.6 y)   INR used for dosin.00! (7/10/2020)   Warfarin maintenance plan:   2.5 mg every day   Weekly warfarin total:   17.5 mg   Plan last modified:   Joe De RPH (6/15/2020)   Next INR check:   2020   Priority:   High   Target end date:       Indications    Anticoagulant long-term use [Z79.01]  S/P AVR [Z95.2]  Acute cerebrovascular accident (CVA) (CMS/HCC) [I63.9]             Anticoagulation Episode Summary     INR check location:       Preferred lab:       Send INR reminders to:    YANDY POLANCO [facility]    Comments:   Acelis **Call every time - may reconsider once stable**      Anticoagulation Care Providers     Provider Role Specialty Phone number    José Antonio Banks MD Referring Cardiology [phone]          Clinic Interview:      INR History:  Anticoagulation Monitoring 2020 2020 7/10/2020   INR 2.10 2.60 2.00   INR Date 2020 2020 7/10/2020   INR Goal 2.5-3.5 2.5-3.5 2.5-3.5   Trend Same Same Same   Last Week Total 32.5 mg 37.5 mg 37.5 mg   Next Week Total 25 mg 22.5 mg 27.5 mg   Sun - - 5 mg ()   Mon 7.5 mg () - -   Tue 5 mg () - -   Wed - 5 mg () -   Thu - 5 mg () -   Fri - - 7.5 mg (7/10)   Sat - - 5 mg ()   Visit Report - - -   Some recent data might be hidden       Plan:  1. INR is Subtherapeutic today- see above in Anticoagulation Summary.   Will instruct Galdino Dallas to Change their warfarin regimen- see above in Anticoagulation Summary.  2. Follow up in 3 days  3. Left VM today:  Restart enoxaparin injections tonight, boost warfarin doses to 7.5mg on Fri, 5mg on Sat & Sun. They have been instructed to call if any changes in medications, doses, concerns, etc.     Paulette Martin RP   No

## 2020-07-29 NOTE — DISCHARGE NOTE PROVIDER - CARE PROVIDER_API CALL
Rod Pires  ORTHOPAEDIC SURGERY  [address] [place] [address], [address]  [place] [address]  Phone: [phone]  Fax: [phone]  Follow Up Time: 2 weeks

## 2020-07-29 NOTE — PHYSICAL THERAPY INITIAL EVALUATION ADULT - SITTING BALANCE: DYNAMIC
normal balance

## 2020-07-29 NOTE — PHYSICAL THERAPY INITIAL EVALUATION ADULT - ADDITIONAL COMMENTS
Pt states she lives with spouse in a private home with about 5 steps to enter (+handrail), and a flight of steps inside (+handrail). Pt states prior to admission being independent with all functional mobility and ADLs with R shoulder injury. Pt states she was working prior to admission.

## 2020-07-30 ENCOUNTER — TRANSCRIPTION ENCOUNTER (OUTPATIENT)
Age: 55
End: 2020-07-30

## 2020-07-30 VITALS
TEMPERATURE: 98 F | SYSTOLIC BLOOD PRESSURE: 122 MMHG | OXYGEN SATURATION: 95 % | RESPIRATION RATE: 18 BRPM | DIASTOLIC BLOOD PRESSURE: 70 MMHG

## 2020-07-30 LAB
ANION GAP SERPL CALC-SCNC: 8 MMOL/L — SIGNIFICANT CHANGE UP (ref 5–17)
BUN SERPL-MCNC: 11 MG/DL — SIGNIFICANT CHANGE UP (ref 7–23)
CALCIUM SERPL-MCNC: 9.1 MG/DL — SIGNIFICANT CHANGE UP (ref 8.4–10.5)
CHLORIDE SERPL-SCNC: 103 MMOL/L — SIGNIFICANT CHANGE UP (ref 96–108)
CO2 SERPL-SCNC: 30 MMOL/L — SIGNIFICANT CHANGE UP (ref 22–31)
CREAT SERPL-MCNC: 0.68 MG/DL — SIGNIFICANT CHANGE UP (ref 0.5–1.3)
GLUCOSE SERPL-MCNC: 109 MG/DL — HIGH (ref 70–99)
HCT VFR BLD CALC: 30.1 % — LOW (ref 34.5–45)
HGB BLD-MCNC: 9.9 G/DL — LOW (ref 11.5–15.5)
MCHC RBC-ENTMCNC: 31.5 PG — SIGNIFICANT CHANGE UP (ref 27–34)
MCHC RBC-ENTMCNC: 32.9 GM/DL — SIGNIFICANT CHANGE UP (ref 32–36)
MCV RBC AUTO: 95.9 FL — SIGNIFICANT CHANGE UP (ref 80–100)
NRBC # BLD: 0 /100 WBCS — SIGNIFICANT CHANGE UP (ref 0–0)
PLATELET # BLD AUTO: 278 K/UL — SIGNIFICANT CHANGE UP (ref 150–400)
POTASSIUM SERPL-MCNC: 3.9 MMOL/L — SIGNIFICANT CHANGE UP (ref 3.5–5.3)
POTASSIUM SERPL-SCNC: 3.9 MMOL/L — SIGNIFICANT CHANGE UP (ref 3.5–5.3)
RBC # BLD: 3.14 M/UL — LOW (ref 3.8–5.2)
RBC # FLD: 12 % — SIGNIFICANT CHANGE UP (ref 10.3–14.5)
SODIUM SERPL-SCNC: 141 MMOL/L — SIGNIFICANT CHANGE UP (ref 135–145)
WBC # BLD: 7.58 K/UL — SIGNIFICANT CHANGE UP (ref 3.8–10.5)
WBC # FLD AUTO: 7.58 K/UL — SIGNIFICANT CHANGE UP (ref 3.8–10.5)

## 2020-07-30 PROCEDURE — 85027 COMPLETE CBC AUTOMATED: CPT

## 2020-07-30 PROCEDURE — 76000 FLUOROSCOPY <1 HR PHYS/QHP: CPT

## 2020-07-30 PROCEDURE — 97165 OT EVAL LOW COMPLEX 30 MIN: CPT

## 2020-07-30 PROCEDURE — C1713: CPT

## 2020-07-30 PROCEDURE — 23615 OPTX PROX HUMRL FX W/INT FIX: CPT | Mod: RT

## 2020-07-30 PROCEDURE — C9399: CPT

## 2020-07-30 PROCEDURE — 80048 BASIC METABOLIC PNL TOTAL CA: CPT

## 2020-07-30 PROCEDURE — 97161 PT EVAL LOW COMPLEX 20 MIN: CPT

## 2020-07-30 RX ORDER — HYDROMORPHONE HYDROCHLORIDE 2 MG/ML
0.5 INJECTION INTRAMUSCULAR; INTRAVENOUS; SUBCUTANEOUS ONCE
Refills: 0 | Status: DISCONTINUED | OUTPATIENT
Start: 2020-07-30 | End: 2020-07-30

## 2020-07-30 RX ORDER — ALPRAZOLAM 0.25 MG
0.5 TABLET ORAL EVERY 8 HOURS
Refills: 0 | Status: DISCONTINUED | OUTPATIENT
Start: 2020-07-30 | End: 2020-07-30

## 2020-07-30 RX ORDER — POLYETHYLENE GLYCOL 3350 17 G/17G
17 POWDER, FOR SOLUTION ORAL DAILY
Refills: 0 | Status: DISCONTINUED | OUTPATIENT
Start: 2020-07-30 | End: 2020-07-30

## 2020-07-30 RX ORDER — MORPHINE SULFATE 50 MG/1
15 CAPSULE, EXTENDED RELEASE ORAL EVERY 8 HOURS
Refills: 0 | Status: DISCONTINUED | OUTPATIENT
Start: 2020-07-30 | End: 2020-07-30

## 2020-07-30 RX ORDER — OXYCODONE HYDROCHLORIDE 5 MG/1
1 TABLET ORAL
Qty: 60 | Refills: 0
Start: 2020-07-30

## 2020-07-30 RX ORDER — OXYCODONE HYDROCHLORIDE 5 MG/1
5 TABLET ORAL
Refills: 0 | Status: DISCONTINUED | OUTPATIENT
Start: 2020-07-30 | End: 2020-07-30

## 2020-07-30 RX ORDER — POLYETHYLENE GLYCOL 3350 17 G/17G
17 POWDER, FOR SOLUTION ORAL
Qty: 0 | Refills: 0 | DISCHARGE
Start: 2020-07-30

## 2020-07-30 RX ORDER — OXYCODONE HYDROCHLORIDE 5 MG/1
10 TABLET ORAL
Refills: 0 | Status: DISCONTINUED | OUTPATIENT
Start: 2020-07-30 | End: 2020-07-30

## 2020-07-30 RX ORDER — OXYCODONE HYDROCHLORIDE 5 MG/1
1 TABLET ORAL
Qty: 0 | Refills: 0 | DISCHARGE
Start: 2020-07-30

## 2020-07-30 RX ORDER — ACETAMINOPHEN 500 MG
3 TABLET ORAL
Qty: 0 | Refills: 0 | DISCHARGE
Start: 2020-07-30

## 2020-07-30 RX ORDER — MORPHINE SULFATE 50 MG/1
15 CAPSULE, EXTENDED RELEASE ORAL ONCE
Refills: 0 | Status: DISCONTINUED | OUTPATIENT
Start: 2020-07-30 | End: 2020-07-30

## 2020-07-30 RX ADMIN — OXYCODONE HYDROCHLORIDE 10 MILLIGRAM(S): 5 TABLET ORAL at 05:32

## 2020-07-30 RX ADMIN — MORPHINE SULFATE 15 MILLIGRAM(S): 50 CAPSULE, EXTENDED RELEASE ORAL at 08:44

## 2020-07-30 RX ADMIN — Medication 400 MILLIGRAM(S): at 11:28

## 2020-07-30 RX ADMIN — HYDROMORPHONE HYDROCHLORIDE 0.5 MILLIGRAM(S): 2 INJECTION INTRAMUSCULAR; INTRAVENOUS; SUBCUTANEOUS at 02:50

## 2020-07-30 RX ADMIN — OXYCODONE HYDROCHLORIDE 10 MILLIGRAM(S): 5 TABLET ORAL at 04:43

## 2020-07-30 RX ADMIN — SODIUM CHLORIDE 125 MILLILITER(S): 9 INJECTION, SOLUTION INTRAVENOUS at 08:46

## 2020-07-30 RX ADMIN — OXYCODONE HYDROCHLORIDE 10 MILLIGRAM(S): 5 TABLET ORAL at 00:12

## 2020-07-30 RX ADMIN — OXYCODONE HYDROCHLORIDE 10 MILLIGRAM(S): 5 TABLET ORAL at 00:50

## 2020-07-30 RX ADMIN — HYDROMORPHONE HYDROCHLORIDE 0.5 MILLIGRAM(S): 2 INJECTION INTRAMUSCULAR; INTRAVENOUS; SUBCUTANEOUS at 02:25

## 2020-07-30 RX ADMIN — MORPHINE SULFATE 15 MILLIGRAM(S): 50 CAPSULE, EXTENDED RELEASE ORAL at 09:14

## 2020-07-30 RX ADMIN — Medication 1000 MILLIGRAM(S): at 11:58

## 2020-07-30 NOTE — OCCUPATIONAL THERAPY INITIAL EVALUATION ADULT - PERTINENT HX OF CURRENT PROBLEM, REHAB EVAL
55 year old female presents for ORIF right humerus. Pt. s/p fall while horseback riding 7/18/20, evaluated in ER at [facility]. Recent orthopedic evaluation revealed surgery was indicated. Pt. reports pain level a 2/10 at rest and a 9/10 with movement. COVID swab performed during PST evaluation 7/28/20.

## 2020-07-30 NOTE — OCCUPATIONAL THERAPY INITIAL EVALUATION ADULT - LIVES WITH, PROFILE
spouse/Pvt home, 5-6 steps to enter without handrail. 1 flight to bed and bath. (I) ADLs and functional transfers without AD/DME

## 2020-07-30 NOTE — PROGRESS NOTE ADULT - SUBJECTIVE AND OBJECTIVE BOX
ORTHO  Patient is a 55y old  Female who presents with a chief complaint of Right Humerus Fracture (29 Jul 2020 16:39)    Pt. resting without complaint    VS-  T(C): 36.6 (07-30-20 @ 04:37), Max: 36.9 (07-29-20 @ 20:33)  HR: 62 (07-30-20 @ 04:37) (62 - 114)  BP: 121/74 (07-30-20 @ 04:37) (94/62 - 143/62)  RR: 18 (07-30-20 @ 04:37) (16 - 18)  SpO2: 94% (07-30-20 @ 04:37) (93% - 100%)  Wt(kg): --    M.S. A&O  Extremity- Right UE- sling in place, aqua cell- dressing C/D/I,                                 Hemovac- self suction- 30cc/shift, 75cc/24hrs  Neuro-              Motor- AROM- elbow, wrist, hand and digits              Sensation- grossly intact to light touch                               9.9    7.58  )-----------( 278      ( 30 Jul 2020 06:25 )             30.1     07-30    141  |  103  |  11  ----------------------------<  109<H>  3.9   |  30  |  0.68    Ca    9.1      30 Jul 2020 06:25             Impression: Stable       Plan: Continue present treatment                 Out of bed, ambulate, non-weight bearing right UE                  Physical therapy follow up                  Continue to monitor                    Kemal Chow PA-C  Orthopaedic Surgery  Team pager [phone]  [phone]

## 2020-07-30 NOTE — OCCUPATIONAL THERAPY INITIAL EVALUATION ADULT - ADDITIONAL COMMENTS
CT shoulder 7/18- Mildly comminuted fracture of the proximal right humerus with fracture line extending across the humeral neck, mild displacement as described above. Enlarged heterogeneous thyroid gland is partially visualized, evaluation limited. Further evaluation with dedicated thyroid ultrasound is suggested.  July 18 Head CT: No acute intracranial hemorrhage or displaced skull fracture. If clinically indicated, short-term follow-up or MRI may be obtained for further evaluation.Cervical spine CT: No fracture or traumatic malalignment in the cervical spine. If there is clinical suspicion for acute fracture or ligamentous/cord injury, MRI may be obtained for further evaluation.Increased size of the right thyroid lesion

## 2020-07-30 NOTE — DISCHARGE NOTE NURSING/CASE MANAGEMENT/SOCIAL WORK - PATIENT PORTAL LINK FT
You can access the FollowMyHealth Patient Portal offered by [facility] by registering at the following website: [email or web address]. By joining [employer]’s FollowMyHealth portal, you will also be able to view your health information using other applications (apps) compatible with our system.

## 2021-03-15 NOTE — ED PROVIDER NOTE - ENMT NEGATIVE STATEMENT, MLM
Ears: no ear pain and no hearing problems.Nose: no nasal congestion and no nasal drainage.Mouth/Throat: no dysphagia, no hoarseness and no throat pain.Neck: no lumps, no pain, no stiffness and no swollen glands. Peng Advancement Flap Text: The defect edges were debeveled with a #15 scalpel blade.  Given the location of the defect, shape of the defect and the proximity to free margins a Peng advancement flap was deemed most appropriate.  Using a sterile surgical marker, an appropriate advancement flap was drawn incorporating the defect and placing the expected incisions within the relaxed skin tension lines where possible. The area thus outlined was incised deep to adipose tissue with a #15 scalpel blade.  The skin margins were undermined to an appropriate distance in all directions utilizing iris scissors.

## 2021-05-20 NOTE — ED ADULT NURSE NOTE - CAS EDN DISCHARGE INTERVENTIONS
Problem: Patient Centered Care  Goal: Patient preferences are identified and integrated in the patient's plan of care  Description: Interventions:  - What would you like us to know as we care for you?  From home, was recently admitted and was discharged 5 Implement non-pharmacological measures as appropriate and evaluate response  - Consider cultural and social influences on pain and pain management  - Manage/alleviate anxiety  - Utilize distraction and/or relaxation techniques  - Monitor for opioid side ef IV discontinued, cath removed intact/by NP Arlene

## 2021-09-30 NOTE — OCCUPATIONAL THERAPY INITIAL EVALUATION ADULT - ORIENTATION, REHAB EVAL
Caller: Paz Perera    Relationship: Self    Medication requested (name and dosage): Benicar 20 MG tablet    [id number] [place], [place] - [address] [facility] [phone] [facility] [phone]     Additional details provided by patient: PATIENT NEEDS A REFILL, PATIENT WAS JUST IN FOR APPOINTMENT IN AUGUST. PLEASE SEND IN THE GENERIC FOR THIS MEDICATION, INSURANCE WON'T PAY FOR THE BRAND NAME.     Best call back number: [phone]    Does the patient have less than a 3 day supply:  [x] Yes  [] No    Mian Shea Rep   09/30/21 09:33 EDT          oriented to person, place, time and situation

## 2021-10-26 NOTE — ED PROVIDER NOTE - OBJECTIVE STATEMENT
56yo female pt, no significant PMHx, ambulatory c/o right dominant shoulder pain s/p fall from her horse this 9:45am. Pt stated she was riding her horse with a helmet this am and fell down from the horse landing on right shoulder. Denies head injury but she passed out briefly.  Denies headache, dizziness or visual changes. Denies neck or back pain. Denies sensory changes or weakness to extremities. Denies CP/SOB/ABD pain or N/V. Denies urinary problem. Denies fever, chills, cough or recent sickness. She took Motrin 800mg after the fall.
No

## 2022-03-28 NOTE — ASU PATIENT PROFILE, ADULT - NS PRO ABUSE SCREEN SUSPICION NEGLECT YN
no Xelshayanz Pregnancy And Lactation Text: This medication is Pregnancy Category D and is not considered safe during pregnancy.  The risk during breast feeding is also uncertain.

## 2024-04-29 NOTE — ED PROCEDURE NOTE - CPROC ED INFORMED CONSENT1
Benefits, risks, and possible complications of procedure explained to patient/caregiver who verbalized understanding and gave verbal consent.
Benefits, risks, and possible complications of procedure explained to patient/caregiver who verbalized understanding and gave verbal consent.
current weight

## 2024-05-06 NOTE — ED PROCEDURE NOTE - PROCEDURE DATE TIME, MLM
Situation:   Received call from Adali SANON  for status update..    Key Assessments:   Spoke with Adali SANON at facility. Patient is in memory care unit.  High anxiety is 1:1. Medications and cares are being provided to patient.     Actions Taken:   Goals of care are being met at facility.     Program plan:   Episode closed.     See hyperlinks within encounter for full documentation.    
18-Jul-2020 14:27
18-Jul-2020 13:50